# Patient Record
Sex: FEMALE | Race: WHITE | Employment: FULL TIME | URBAN - METROPOLITAN AREA
[De-identification: names, ages, dates, MRNs, and addresses within clinical notes are randomized per-mention and may not be internally consistent; named-entity substitution may affect disease eponyms.]

---

## 2017-09-25 ENCOUNTER — HOSPITAL ENCOUNTER (OUTPATIENT)
Dept: ENDOSCOPY | Age: 72
Discharge: HOME | End: 2017-09-25
Attending: INTERNAL MEDICINE | Admitting: INTERNAL MEDICINE

## 2017-09-25 VITALS
OXYGEN SATURATION: 98 % | HEART RATE: 69 BPM | SYSTOLIC BLOOD PRESSURE: 160 MMHG | DIASTOLIC BLOOD PRESSURE: 97 MMHG | TEMPERATURE: 98.1 F | RESPIRATION RATE: 20 BRPM | WEIGHT: 180 LBS | HEIGHT: 64 IN | BODY MASS INDEX: 30.73 KG/M2

## 2017-09-25 RX ORDER — PANTOPRAZOLE SODIUM 40 MG/1
TABLET, DELAYED RELEASE ORAL DAILY
COMMUNITY
Start: 2017-09-07

## 2017-09-25 RX ORDER — AMOXICILLIN 500 MG/1
CAPSULE ORAL PRN
COMMUNITY
Start: 2017-09-07

## 2017-09-25 RX ORDER — OXYCODONE AND ACETAMINOPHEN 10; 325 MG/1; MG/1
TABLET ORAL 3 TIMES DAILY PRN
COMMUNITY
Start: 2017-09-13

## 2017-09-25 RX ORDER — AMLODIPINE BESYLATE 5 MG/1
TABLET ORAL DAILY
COMMUNITY
Start: 2017-09-07

## 2017-09-25 RX ORDER — GABAPENTIN 300 MG/1
CAPSULE ORAL 2 TIMES DAILY
COMMUNITY
Start: 2017-09-06

## 2017-09-25 RX ORDER — ATENOLOL 50 MG/1
TABLET ORAL DAILY
COMMUNITY
Start: 2017-09-07

## 2018-04-16 ENCOUNTER — HOSPITAL ENCOUNTER (OUTPATIENT)
Dept: ULTRASOUND IMAGING | Age: 73
Discharge: HOME | End: 2018-04-16
Attending: INTERNAL MEDICINE | Admitting: INTERNAL MEDICINE

## 2018-04-16 ENCOUNTER — HOSPITAL ENCOUNTER (OUTPATIENT)
Dept: ENDOSCOPY | Age: 73
Discharge: HOME | End: 2018-04-16
Attending: INTERNAL MEDICINE | Admitting: INTERNAL MEDICINE

## 2018-04-16 VITALS
OXYGEN SATURATION: 100 % | SYSTOLIC BLOOD PRESSURE: 153 MMHG | BODY MASS INDEX: 29.02 KG/M2 | WEIGHT: 170 LBS | TEMPERATURE: 97.2 F | HEART RATE: 63 BPM | DIASTOLIC BLOOD PRESSURE: 71 MMHG | RESPIRATION RATE: 16 BRPM | HEIGHT: 64 IN

## 2018-04-16 DIAGNOSIS — R10.13 EPIGASTRIC PAIN: ICD-10-CM

## 2018-04-16 DIAGNOSIS — D50.9 IRON DEFICIENCY ANEMIA, UNSPECIFIED IRON DEFICIENCY ANEMIA TYPE: ICD-10-CM

## 2018-04-16 ASSESSMENT — PAIN - FUNCTIONAL ASSESSMENT: PAIN_FUNCTIONAL_ASSESSMENT: 0-10

## 2024-01-11 ENCOUNTER — HOSPITAL ENCOUNTER (OUTPATIENT)
Age: 79
Setting detail: OUTPATIENT SURGERY
Discharge: HOME | End: 2024-01-11
Attending: INTERNAL MEDICINE | Admitting: INTERNAL MEDICINE
Payer: MEDICARE

## 2024-01-11 ENCOUNTER — ANESTHESIA (OUTPATIENT)
Dept: ENDOSCOPY | Age: 79
End: 2024-01-11
Payer: MEDICARE

## 2024-01-11 ENCOUNTER — ANESTHESIA EVENT (OUTPATIENT)
Dept: ENDOSCOPY | Age: 79
End: 2024-01-11
Payer: MEDICARE

## 2024-01-11 VITALS
SYSTOLIC BLOOD PRESSURE: 151 MMHG | HEIGHT: 66 IN | HEART RATE: 97 BPM | WEIGHT: 200 LBS | RESPIRATION RATE: 20 BRPM | DIASTOLIC BLOOD PRESSURE: 83 MMHG | BODY MASS INDEX: 32.14 KG/M2 | TEMPERATURE: 97.3 F | OXYGEN SATURATION: 90 %

## 2024-01-11 DIAGNOSIS — D50.0 IRON DEFICIENCY ANEMIA DUE TO CHRONIC BLOOD LOSS: ICD-10-CM

## 2024-01-11 PROCEDURE — 3700000001 HC ADD 15 MINUTES (ANESTHESIA): Performed by: INTERNAL MEDICINE

## 2024-01-11 PROCEDURE — 3609017100 HC EGD: Performed by: INTERNAL MEDICINE

## 2024-01-11 PROCEDURE — 3609027000 HC COLONOSCOPY: Performed by: INTERNAL MEDICINE

## 2024-01-11 PROCEDURE — 7100000000 HC PACU RECOVERY - FIRST 15 MIN: Performed by: INTERNAL MEDICINE

## 2024-01-11 PROCEDURE — 2709999900 HC NON-CHARGEABLE SUPPLY: Performed by: INTERNAL MEDICINE

## 2024-01-11 PROCEDURE — 7100000011 HC PHASE II RECOVERY - ADDTL 15 MIN: Performed by: INTERNAL MEDICINE

## 2024-01-11 PROCEDURE — 2580000003 HC RX 258: Performed by: INTERNAL MEDICINE

## 2024-01-11 PROCEDURE — 2500000003 HC RX 250 WO HCPCS: Performed by: NURSE ANESTHETIST, CERTIFIED REGISTERED

## 2024-01-11 PROCEDURE — 3700000000 HC ANESTHESIA ATTENDED CARE: Performed by: INTERNAL MEDICINE

## 2024-01-11 PROCEDURE — 2720000010 HC SURG SUPPLY STERILE: Performed by: INTERNAL MEDICINE

## 2024-01-11 PROCEDURE — 7100000010 HC PHASE II RECOVERY - FIRST 15 MIN: Performed by: INTERNAL MEDICINE

## 2024-01-11 PROCEDURE — 88305 TISSUE EXAM BY PATHOLOGIST: CPT

## 2024-01-11 PROCEDURE — 6360000002 HC RX W HCPCS: Performed by: NURSE ANESTHETIST, CERTIFIED REGISTERED

## 2024-01-11 PROCEDURE — 7100000001 HC PACU RECOVERY - ADDTL 15 MIN: Performed by: INTERNAL MEDICINE

## 2024-01-11 RX ORDER — FENTANYL CITRATE 50 UG/ML
INJECTION, SOLUTION INTRAMUSCULAR; INTRAVENOUS PRN
Status: DISCONTINUED | OUTPATIENT
Start: 2024-01-11 | End: 2024-01-11 | Stop reason: SDUPTHER

## 2024-01-11 RX ORDER — SODIUM CHLORIDE, SODIUM LACTATE, POTASSIUM CHLORIDE, CALCIUM CHLORIDE 600; 310; 30; 20 MG/100ML; MG/100ML; MG/100ML; MG/100ML
INJECTION, SOLUTION INTRAVENOUS CONTINUOUS
Status: DISCONTINUED | OUTPATIENT
Start: 2024-01-11 | End: 2024-01-11 | Stop reason: HOSPADM

## 2024-01-11 RX ORDER — DEXAMETHASONE SODIUM PHOSPHATE 4 MG/ML
INJECTION, SOLUTION INTRA-ARTICULAR; INTRALESIONAL; INTRAMUSCULAR; INTRAVENOUS; SOFT TISSUE PRN
Status: DISCONTINUED | OUTPATIENT
Start: 2024-01-11 | End: 2024-01-11 | Stop reason: SDUPTHER

## 2024-01-11 RX ORDER — ONDANSETRON 2 MG/ML
INJECTION INTRAMUSCULAR; INTRAVENOUS PRN
Status: DISCONTINUED | OUTPATIENT
Start: 2024-01-11 | End: 2024-01-11 | Stop reason: SDUPTHER

## 2024-01-11 RX ORDER — SOTALOL HYDROCHLORIDE 80 MG/1
80 TABLET ORAL 2 TIMES DAILY
COMMUNITY
Start: 2022-11-17

## 2024-01-11 RX ORDER — PROPOFOL 10 MG/ML
INJECTION, EMULSION INTRAVENOUS PRN
Status: DISCONTINUED | OUTPATIENT
Start: 2024-01-11 | End: 2024-01-11 | Stop reason: SDUPTHER

## 2024-01-11 RX ORDER — SUCCINYLCHOLINE CHLORIDE 20 MG/ML
INJECTION INTRAMUSCULAR; INTRAVENOUS PRN
Status: DISCONTINUED | OUTPATIENT
Start: 2024-01-11 | End: 2024-01-11 | Stop reason: SDUPTHER

## 2024-01-11 RX ORDER — LIDOCAINE HYDROCHLORIDE 20 MG/ML
INJECTION, SOLUTION INFILTRATION; PERINEURAL PRN
Status: DISCONTINUED | OUTPATIENT
Start: 2024-01-11 | End: 2024-01-11 | Stop reason: SDUPTHER

## 2024-01-11 RX ORDER — ROCURONIUM BROMIDE 10 MG/ML
INJECTION, SOLUTION INTRAVENOUS PRN
Status: DISCONTINUED | OUTPATIENT
Start: 2024-01-11 | End: 2024-01-11 | Stop reason: SDUPTHER

## 2024-01-11 RX ADMIN — SUCCINYLCHOLINE CHLORIDE 120 MG: 20 INJECTION, SOLUTION INTRAMUSCULAR; INTRAVENOUS; PARENTERAL at 14:51

## 2024-01-11 RX ADMIN — DEXAMETHASONE SODIUM PHOSPHATE 8 MG: 4 INJECTION, SOLUTION INTRAMUSCULAR; INTRAVENOUS at 14:56

## 2024-01-11 RX ADMIN — ROCURONIUM BROMIDE 50 MG: 10 INJECTION, SOLUTION INTRAVENOUS at 14:58

## 2024-01-11 RX ADMIN — SODIUM CHLORIDE, POTASSIUM CHLORIDE, SODIUM LACTATE AND CALCIUM CHLORIDE: 600; 310; 30; 20 INJECTION, SOLUTION INTRAVENOUS at 12:55

## 2024-01-11 RX ADMIN — FENTANYL CITRATE 50 MCG: 50 INJECTION, SOLUTION INTRAMUSCULAR; INTRAVENOUS at 14:43

## 2024-01-11 RX ADMIN — ONDANSETRON 4 MG: 2 INJECTION INTRAMUSCULAR; INTRAVENOUS at 14:56

## 2024-01-11 RX ADMIN — PROPOFOL 200 MG: 10 INJECTION, EMULSION INTRAVENOUS at 14:51

## 2024-01-11 RX ADMIN — SUGAMMADEX 200 MG: 100 INJECTION, SOLUTION INTRAVENOUS at 15:28

## 2024-01-11 RX ADMIN — LIDOCAINE HYDROCHLORIDE 100 MG: 20 INJECTION, SOLUTION INFILTRATION; PERINEURAL at 14:51

## 2024-01-11 ASSESSMENT — PAIN SCALES - GENERAL
PAINLEVEL_OUTOF10: 0

## 2024-01-11 NOTE — PROGRESS NOTES
Ambulatory Surgery/Procedure Discharge Note    Vitals:    01/11/24 1636   BP: (!) 151/83   Pulse: 97   Resp: 20   Temp: 97.3 °F (36.3 °C)   SpO2: 90%   Bp meets keegan score criteria for discharge home.     In: 600 [I.V.:600]  Out: -     Restroom use offered before discharge.  Yes    Pain assessment:  none  Pain Level: 0    Pt states readiness to discharge home. No complaints of pain or nausea.     Patient discharged to home/self care. Patient discharged via wheel chair by transporter to waiting family/S.O.       1/11/2024 4:51 PM

## 2024-01-11 NOTE — PROGRESS NOTES
Admitted to pacu from [facility]. Pt awake and alert. Has dry,nonproductive cough. Says she is thirsty. Given ice chips without troubles swallowing. VSS on monitor. Report given by CRNA.

## 2024-01-11 NOTE — DISCHARGE INSTRUCTIONS
[facility] AMBULATORY PROCEDURE DISCHARGE INSTRUCTIONS    There are potential side effects of anesthesia or sedation you may experience for the first 24 hours.  These side effects include:    Confusion or Memory loss, Dizziness, or Delayed Reaction Times   [x]A responsible person should be with you for the next 24 hours.  Do not operate any vehicles (automobiles, bicycles, motorcycles) or power tools or machinery for 24 hours.  Do not sign any legal documents or make any legal decisions for 24 hours. Do not drink alcohol for 24 hours or while taking narcotic pain medication.      Nausea    [x]Start with light diet and progress to your normal diet as you feel like eating. However, if you experience nausea or repeated episodes of vomiting which persist beyond 12-24 hours, notify your physician.  Once nausea has passed, remember to keep drinking fluids.    Difficulty Passing Urine  [x]Drink extra amounts of fluid today.  Notify your physician if you have not urinated within 8 hours after your procedure or you feel uncomfortable.      Irritated Throat from a Breathing Tube  [x]Drink extra amounts of fluid today.  Lozenges may help.    Muscle Aches  [x]You may experience some generalized body aches as your muscles recover from medications used to relax them during surgery.  These will gradually subside.    MEDICATION INSTRUCTIONS:  []Prescription(S) x     sent with you.  Use as directed.  When taking pain medications, you may experience the side effect of dizziness or drowsiness.  Do not drink alcohol or drive when taking these medications.  []Prescription(S) x          Called to Pharmacy Name and location:    [x]Give the list of your medications to your primary care physician on your next visit. Keep your med list updated and carry it with in case of emergencies.    [] Narcotic pain medications can cause the side effect of significant constipation.  You may want to add a stool softener to your postoperative

## 2024-01-11 NOTE — PROCEDURES
EGD/COLONOSCOPY     Patient: Janeth Lopez MRN: [id number]   YOB: 1945 Age: 78 y.o. Sex: female   Unit: [facility] ENDOSCOPY Room/Bed: [facility]/NONE Location: [facility]    Admitting Physician: CHANTAL ROCHE     Primary Care Physician: Meghan Willard MD      DATE OF PROCEDURE: 1/11/2024  PROCEDURE: EGD/Colonoscopy    PREOPERATIVE DIAGNOSIS: Iron deficiency anemia due to chronic blood loss [D50.0]  HPI:  egd/push enteroscopy and colonoscopy for JUNIE     Yolie has a long history of iron deficiency anemia, which has prevented her from taking Eliquis, aspirin or NSAID. This is favored to be on the basis of Bruce erosions based on the workup previously, and she does have a large hiatal hernia. Thus we are planning panendoscopy to rule out AVM bleed and other causes. Her last colonoscopy was 6 years ago. She is not enthusiastic at all about hiatal hernia repair so we do need to rule out other causes. In the meantime, she is requiring weekly oral iron to maintain her hemoglobin, and that has been discouraging for her.  She has been taking her Protonix twice a day but the second dose before bed, so taking it before dinner might make some improvement in the Bruce erosions.    ENDOSCOPIST: CHANTAL ROCHE MD    POSTOPERATIVE DIAGNOSIS:    -Esophagus unremarkable.  -stomach contained 8 cm sliding-type hiatal hernia with Bruce erosions and small ulcers.  -Duodenum unremarkable.    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  Otherwise normal jejunum to about 30 cm distal to ligament of Treitz.  -Terminal ileum unremarkable in the distal 2 cm.  -Mild left-sided diverticulosis and fair prep    In short, I continue to suspect Bruce erosions and small ulcers as the main driving force for her iron deficiency anemia.  I cannot rule out small bowel AVMs beyond the reach of bidirectional

## 2024-01-11 NOTE — PROGRESS NOTES
PACU Transfer to [facility]    Vitals:    01/11/24 1615   BP: (!) 139/107   Pulse: 100   Resp: 23   Temp: 98 °F (36.7 °C)   SpO2: 93%     BP retake 150/98 at 1627    Intake/Output Summary (Last 24 hours) at 1/11/2024 1627  Last data filed at 1/11/2024 1509  Gross per 24 hour   Intake 600 ml   Output --   Net 600 ml       Pain assessment:  none  Pain Level: 0    Patient transferred to care of TELMA RN.    1/11/2024 4:27 PM

## 2024-01-11 NOTE — ANESTHESIA POSTPROCEDURE EVALUATION
Department of Anesthesiology  Postprocedure Note    Patient: Janeth Lopez  MRN: [id number]  YOB: 1945  Date of evaluation: 1/11/2024    Procedure Summary       Date: 01/11/24 Room / Location: [place] [address] / [facility]    Anesthesia Start: 1443 Anesthesia Stop: 1543    Procedures:       ESOPHAGOGASTRODUODENOSCOPY WITH PUSH ENTEROSCOPY ESOPHAGOGASTRODUODENOSCOPY WITH BIOPSY/CLIP      COLONOSCOPY Diagnosis:       Iron deficiency anemia due to chronic blood loss      (Iron deficiency anemia due to chronic blood loss [D50.0])    Surgeons: Baljeet Avery MD Responsible Provider: Christ Zabala DO    Anesthesia Type: MAC ASA Status: 3            Anesthesia Type: No value filed.    Carolyn Phase I: Carolyn Score: 8    Carolyn Phase II: Carolyn Score: 10    Anesthesia Post Evaluation    Patient location during evaluation: PACU  Patient participation: complete - patient participated  Level of consciousness: awake and alert  Pain score: 0  Airway patency: patent  Nausea & Vomiting: no nausea and no vomiting  Cardiovascular status: hemodynamically stable  Respiratory status: acceptable  Hydration status: euvolemic  Pain management: adequate    No notable events documented.

## 2024-01-11 NOTE — H&P
Gastroenterology Outpatient History and Physical     Patient: Janeth Lopez MRN: [id number] Sex: female   YOB: 1945 Age: 78 y.o. Location: [facility]    Date:1/11/2024  Primary Care Physician: Meghan Willard MD         Patient: Janeth Lopez    Physician: CHANTAL ROCHE MD    History of Present Illness: egd/push enteroscopy and colonoscopy for JUNIE    Yolie has a long history of iron deficiency anemia, which has prevented her from taking Eliquis, aspirin or NSAID. This is favored to be on the basis of Bruce erosions based on the workup previously, and she does have a large hiatal hernia. However, I think it is worth repeating panendoscopy to rule out AVM bleed and other causes. Her last colonoscopy was 6 years ago. She is not enthusiastic at all about hiatal hernia repair so we do need to rule out other causes. In the meantime, she is requiring weekly oral iron to maintain her hemoglobin, and that has been discouraging for her.  She has been taking her Protonix twice a day but the second dose before bed, so taking it before dinner might make some improvement inThe Bruce erosions.    Review of Systems:  Weight Loss: No  Dysphagia: No  Dyspepsia: No  History:  Past Medical History:   Diagnosis Date    A-fib (HCC)     Allergic rhinitis     Anesthesia     block was not successful for pain management for ankle surgery    Arthritis     OSTEOARTHRITIS    Back pain     Cerebral artery occlusion with cerebral infarction (HCC)     Degenerative disc disease at L5-S1 level     LUMBROSACRAL    High blood pressure disorder     Hyperlipidemia     Scoliosis     Whiplash 2002    HORSE INJURY      Past Surgical History:   Procedure Laterality Date    COLONOSCOPY      ENDOSCOPY, COLON, DIAGNOSTIC      FOOT SURGERY Left 04/22/2015    TRIPLE ARTHRODESIS LEFT FOOT, REMOVAL OF PAINFUL BONE SPURS    KNEE SURGERY Left 01/18/2008    ARTHROSCOPY    KNEE SURGERY Right 07/18/2008    arthroscopy/joint  replacement    LEFT ATRIAL APPENDAGE LIGATION      MITRAL VALVE MAZE PROCEDURE      PORTACATH PLACEMENT      TOTAL KNEE ARTHROPLASTY Left 07/2016      Social History     Socioeconomic History    Marital status:      Spouse name: None    Number of children: None    Years of education: None    Highest education level: None   Tobacco Use    Smoking status: Never    Smokeless tobacco: Never   Vaping Use    Vaping Use: Never used   Substance and Sexual Activity    Alcohol use: Not Currently    Sexual activity: Not Currently      History reviewed. No pertinent family history.  Allergies:   Allergies   Allergen Reactions    Celecoxib Other (See Comments)     GI bleed    Atorvastatin Myalgia    Enoxaparin Sodium Other (See Comments)     Anemia - hemoglobin dropped after use    Apixaban Other (See Comments)     Causes hernia to bleed    Enoxaparin Sodium      Other reaction(s): Other (See Comments)  Anemia - hemoglobin dropped after use    Iron Sucrose Other (See Comments)     Patient states that she cannot take this medication because it puts her into rapid afib. That ferrlecit is used instead.    Lidocaine Other (See Comments)     Stroke symptoms    Procaine Other (See Comments)     Stroke symptoms    Statins      Myalgias     Medications:   Prior to Admission medications    Medication Sig Start Date End Date Taking? Authorizing Provider   sotalol (BETAPACE) 80 MG tablet Take 1 tablet by mouth 2 times daily 11/17/22  Yes Berna Cruz MD   gabapentin (NEURONTIN) 300 MG capsule 2 times daily  Patient not taking: Reported on 1/11/2024 9/6/17   Berna Cruz MD   oxyCODONE-acetaminophen (PERCOCET)  MG per tablet 3 times daily as needed . 9/13/17   Berna Cruz MD   pantoprazole (PROTONIX) 40 MG tablet daily 9/7/17   Berna Cruz MD   atenolol (TENORMIN) 50 MG tablet daily  Patient not taking: Reported on 1/11/2024 9/7/17   Berna Cruz MD   amLODIPine (NORVASC) 5 MG

## 2024-01-11 NOTE — ANESTHESIA PRE PROCEDURE
Cardiovascular:  Exercise tolerance: good (>4 METS)  (+) hypertension:, dysrhythmias: atrial fibrillation    (-) pacemaker, valvular problems/murmurs, past MI, CAD, CABG/stent,  angina,  CHF, orthopnea, PND,  SOLORZANO and no hyperlipidemia      Rhythm: regular  Rate: normal                    Neuro/Psych:   (+) CVA: no interval change, neuromuscular disease:            GI/Hepatic/Renal:   (+) hiatal hernia, bowel prep          Endo/Other:    (+) blood dyscrasia: anemia, arthritis: OA..                 Abdominal:             Vascular: negative vascular ROS.         Other Findings:         Anesthesia Plan      MAC     ASA 3       Induction: intravenous.      Anesthetic plan and risks discussed with patient.      Plan discussed with CRNA.    Attending anesthesiologist reviewed and agrees with Preprocedure content            Christ Zabala DO   1/11/2024

## (undated) DEVICE — FORCEPS BX L240CM JAW DIA2.4MM ORNG L CAP W/ NDL DISP RAD

## (undated) DEVICE — CLIPPING DEVICE: Brand: RESOLUTION CLIP